# Patient Record
Sex: FEMALE | Race: WHITE | NOT HISPANIC OR LATINO | URBAN - METROPOLITAN AREA
[De-identification: names, ages, dates, MRNs, and addresses within clinical notes are randomized per-mention and may not be internally consistent; named-entity substitution may affect disease eponyms.]

---

## 2018-07-03 ENCOUNTER — OUTPATIENT (OUTPATIENT)
Dept: OUTPATIENT SERVICES | Facility: HOSPITAL | Age: 68
LOS: 1 days | End: 2018-07-03
Payer: COMMERCIAL

## 2018-07-03 ENCOUNTER — OUTPATIENT (OUTPATIENT)
Dept: OUTPATIENT SERVICES | Facility: HOSPITAL | Age: 68
LOS: 1 days | End: 2018-07-03
Payer: MEDICARE

## 2018-07-03 DIAGNOSIS — Z22.321 CARRIER OR SUSPECTED CARRIER OF METHICILLIN SUSCEPTIBLE STAPHYLOCOCCUS AUREUS: ICD-10-CM

## 2018-07-03 LAB
MRSA PCR RESULT.: NEGATIVE — SIGNIFICANT CHANGE UP
S AUREUS DNA NOSE QL NAA+PROBE: NEGATIVE — SIGNIFICANT CHANGE UP

## 2018-07-03 PROCEDURE — 73502 X-RAY EXAM HIP UNI 2-3 VIEWS: CPT

## 2018-07-03 PROCEDURE — 73502 X-RAY EXAM HIP UNI 2-3 VIEWS: CPT | Mod: 26,LT

## 2018-07-03 PROCEDURE — 87641 MR-STAPH DNA AMP PROBE: CPT

## 2018-07-24 VITALS
DIASTOLIC BLOOD PRESSURE: 71 MMHG | RESPIRATION RATE: 20 BRPM | TEMPERATURE: 98 F | HEART RATE: 62 BPM | HEIGHT: 62 IN | WEIGHT: 134.48 LBS | OXYGEN SATURATION: 98 % | SYSTOLIC BLOOD PRESSURE: 183 MMHG

## 2018-07-24 NOTE — H&P ADULT - NSHPPHYSICALEXAM_GEN_ALL_CORE
Left hip decreased rom 2/2 pain  Rest of PE per MD clearance Left hip decreased rom 2/2 pain  MSK: No deformity or open wounds. No rashes or lesions. EHL/TA/GS/FHL 5/5 BLE. Sensation intact and equal BLE. Skin warm and well perfused. DP palpable BLE. Cap refill brisk.   Rest of PE per MD clearance

## 2018-07-24 NOTE — H&P ADULT - HISTORY OF PRESENT ILLNESS
68F c/o left hip pain  Presents today for elective left THR. 68F c/o left hip pain x several months without improvement. Pt denies accident or injury to bring on pain. Denies numbness/tingling/paresthesias to LE. Ambulates with cane for assistance. Presents today for elective left THR.

## 2018-07-24 NOTE — PATIENT PROFILE ADULT. - PSH
Surgery, elective  varicose vein removal,  Right leg History of     Surgery, elective  varicose vein removal,  Right leg

## 2018-07-24 NOTE — H&P ADULT - NSHPLABSRESULTS_GEN_ALL_CORE
Preop cbc, bmp, pt/inr, ptt wnl; nasal swab neg  UA sm leuk repeat dos  preop cxr wnl per clearance  preop ekg wnl per clearance

## 2018-07-25 ENCOUNTER — INPATIENT (INPATIENT)
Facility: HOSPITAL | Age: 68
LOS: 1 days | Discharge: HOME HEALTH | DRG: 470 | End: 2018-07-27
Payer: MEDICARE

## 2018-07-25 DIAGNOSIS — Z41.9 ENCOUNTER FOR PROCEDURE FOR PURPOSES OTHER THAN REMEDYING HEALTH STATE, UNSPECIFIED: Chronic | ICD-10-CM

## 2018-07-25 DIAGNOSIS — Z98.891 HISTORY OF UTERINE SCAR FROM PREVIOUS SURGERY: Chronic | ICD-10-CM

## 2018-07-25 DIAGNOSIS — M16.12 UNILATERAL PRIMARY OSTEOARTHRITIS, LEFT HIP: ICD-10-CM

## 2018-07-25 LAB
HCT VFR BLD CALC: 35.2 % — SIGNIFICANT CHANGE UP (ref 34.5–45)
HGB BLD-MCNC: 11.1 G/DL — LOW (ref 11.5–15.5)
MCHC RBC-ENTMCNC: 31.4 PG — SIGNIFICANT CHANGE UP (ref 27–34)
MCHC RBC-ENTMCNC: 31.5 G/DL — LOW (ref 32–36)
MCV RBC AUTO: 99.7 FL — SIGNIFICANT CHANGE UP (ref 80–100)
PLATELET # BLD AUTO: 115 K/UL — LOW (ref 150–400)
RBC # BLD: 3.53 M/UL — LOW (ref 3.8–5.2)
RBC # FLD: 12.3 % — SIGNIFICANT CHANGE UP (ref 10.3–16.9)
WBC # BLD: 4.4 K/UL — SIGNIFICANT CHANGE UP (ref 3.8–10.5)
WBC # FLD AUTO: 4.4 K/UL — SIGNIFICANT CHANGE UP (ref 3.8–10.5)

## 2018-07-25 PROCEDURE — 73502 X-RAY EXAM HIP UNI 2-3 VIEWS: CPT | Mod: 26,LT

## 2018-07-25 RX ORDER — DOCUSATE SODIUM 100 MG
100 CAPSULE ORAL THREE TIMES A DAY
Qty: 0 | Refills: 0 | Status: DISCONTINUED | OUTPATIENT
Start: 2018-07-25 | End: 2018-07-27

## 2018-07-25 RX ORDER — SCOPALAMINE 1 MG/3D
1 PATCH, EXTENDED RELEASE TRANSDERMAL ONCE
Qty: 0 | Refills: 0 | Status: COMPLETED | OUTPATIENT
Start: 2018-07-25 | End: 2018-07-25

## 2018-07-25 RX ORDER — METOCLOPRAMIDE HCL 10 MG
10 TABLET ORAL EVERY 6 HOURS
Qty: 0 | Refills: 0 | Status: DISCONTINUED | OUTPATIENT
Start: 2018-07-25 | End: 2018-07-27

## 2018-07-25 RX ORDER — SENNA PLUS 8.6 MG/1
2 TABLET ORAL AT BEDTIME
Qty: 0 | Refills: 0 | Status: DISCONTINUED | OUTPATIENT
Start: 2018-07-25 | End: 2018-07-25

## 2018-07-25 RX ORDER — HYDROMORPHONE HYDROCHLORIDE 2 MG/ML
0.5 INJECTION INTRAMUSCULAR; INTRAVENOUS; SUBCUTANEOUS
Qty: 0 | Refills: 0 | Status: DISCONTINUED | OUTPATIENT
Start: 2018-07-25 | End: 2018-07-26

## 2018-07-25 RX ORDER — CEFAZOLIN SODIUM 1 G
2000 VIAL (EA) INJECTION EVERY 8 HOURS
Qty: 0 | Refills: 0 | Status: COMPLETED | OUTPATIENT
Start: 2018-07-25 | End: 2018-07-25

## 2018-07-25 RX ORDER — HYDRALAZINE HCL 50 MG
10 TABLET ORAL ONCE
Qty: 0 | Refills: 0 | Status: COMPLETED | OUTPATIENT
Start: 2018-07-25 | End: 2018-07-25

## 2018-07-25 RX ORDER — AMLODIPINE BESYLATE 2.5 MG/1
10 TABLET ORAL ONCE
Qty: 0 | Refills: 0 | Status: COMPLETED | OUTPATIENT
Start: 2018-07-25 | End: 2018-07-25

## 2018-07-25 RX ORDER — ONDANSETRON 8 MG/1
4 TABLET, FILM COATED ORAL EVERY 6 HOURS
Qty: 0 | Refills: 0 | Status: DISCONTINUED | OUTPATIENT
Start: 2018-07-25 | End: 2018-07-27

## 2018-07-25 RX ORDER — SENNA PLUS 8.6 MG/1
2 TABLET ORAL AT BEDTIME
Qty: 0 | Refills: 0 | Status: DISCONTINUED | OUTPATIENT
Start: 2018-07-25 | End: 2018-07-27

## 2018-07-25 RX ORDER — SODIUM CHLORIDE 9 MG/ML
1000 INJECTION, SOLUTION INTRAVENOUS
Qty: 0 | Refills: 0 | Status: DISCONTINUED | OUTPATIENT
Start: 2018-07-25 | End: 2018-07-27

## 2018-07-25 RX ORDER — ACETAMINOPHEN 500 MG
650 TABLET ORAL EVERY 6 HOURS
Qty: 0 | Refills: 0 | Status: DISCONTINUED | OUTPATIENT
Start: 2018-07-25 | End: 2018-07-26

## 2018-07-25 RX ORDER — POLYETHYLENE GLYCOL 3350 17 G/17G
17 POWDER, FOR SOLUTION ORAL DAILY
Qty: 0 | Refills: 0 | Status: DISCONTINUED | OUTPATIENT
Start: 2018-07-25 | End: 2018-07-27

## 2018-07-25 RX ORDER — ASPIRIN/CALCIUM CARB/MAGNESIUM 324 MG
325 TABLET ORAL
Qty: 0 | Refills: 0 | Status: DISCONTINUED | OUTPATIENT
Start: 2018-07-25 | End: 2018-07-27

## 2018-07-25 RX ORDER — BUPIVACAINE 13.3 MG/ML
20 INJECTION, SUSPENSION, LIPOSOMAL INFILTRATION ONCE
Qty: 0 | Refills: 0 | Status: DISCONTINUED | OUTPATIENT
Start: 2018-07-25 | End: 2018-07-27

## 2018-07-25 RX ADMIN — Medication 100 MILLIGRAM(S): at 15:05

## 2018-07-25 RX ADMIN — HYDROMORPHONE HYDROCHLORIDE 0.5 MILLIGRAM(S): 2 INJECTION INTRAMUSCULAR; INTRAVENOUS; SUBCUTANEOUS at 13:25

## 2018-07-25 RX ADMIN — ONDANSETRON 4 MILLIGRAM(S): 8 TABLET, FILM COATED ORAL at 14:03

## 2018-07-25 RX ADMIN — HYDROMORPHONE HYDROCHLORIDE 0.5 MILLIGRAM(S): 2 INJECTION INTRAMUSCULAR; INTRAVENOUS; SUBCUTANEOUS at 19:50

## 2018-07-25 RX ADMIN — HYDROMORPHONE HYDROCHLORIDE 0.5 MILLIGRAM(S): 2 INJECTION INTRAMUSCULAR; INTRAVENOUS; SUBCUTANEOUS at 19:35

## 2018-07-25 RX ADMIN — Medication 10 MILLIGRAM(S): at 15:05

## 2018-07-25 RX ADMIN — Medication 100 MILLIGRAM(S): at 21:49

## 2018-07-25 RX ADMIN — SCOPALAMINE 1 PATCH: 1 PATCH, EXTENDED RELEASE TRANSDERMAL at 20:24

## 2018-07-25 RX ADMIN — HYDROMORPHONE HYDROCHLORIDE 0.5 MILLIGRAM(S): 2 INJECTION INTRAMUSCULAR; INTRAVENOUS; SUBCUTANEOUS at 14:17

## 2018-07-25 RX ADMIN — SODIUM CHLORIDE 80 MILLILITER(S): 9 INJECTION, SOLUTION INTRAVENOUS at 14:09

## 2018-07-25 RX ADMIN — Medication 100 MILLIGRAM(S): at 23:53

## 2018-07-25 RX ADMIN — POLYETHYLENE GLYCOL 3350 17 GRAM(S): 17 POWDER, FOR SOLUTION ORAL at 15:04

## 2018-07-25 RX ADMIN — SENNA PLUS 2 TABLET(S): 8.6 TABLET ORAL at 21:49

## 2018-07-25 RX ADMIN — Medication 650 MILLIGRAM(S): at 21:49

## 2018-07-25 RX ADMIN — Medication 650 MILLIGRAM(S): at 15:05

## 2018-07-25 RX ADMIN — Medication 325 MILLIGRAM(S): at 17:48

## 2018-07-25 RX ADMIN — ONDANSETRON 4 MILLIGRAM(S): 8 TABLET, FILM COATED ORAL at 19:50

## 2018-07-25 RX ADMIN — AMLODIPINE BESYLATE 10 MILLIGRAM(S): 2.5 TABLET ORAL at 20:24

## 2018-07-25 NOTE — DISCHARGE NOTE ADULT - MEDICATION SUMMARY - MEDICATIONS TO TAKE
I will START or STAY ON the medications listed below when I get home from the hospital:    celecoxib 200 mg oral capsule  -- 1 cap(s) by mouth 2 times a day  -- Indication: For Antiinflammatory, pain control    acetaminophen 325 mg oral tablet  -- 2 tab(s) by mouth every 6 hours, as needed, pain (1-3) or fever T100.3 or higher  -- Indication: For Mild pain; fever     oxyCODONE 5 mg oral tablet  -- 1 tab(s) by mouth every 4 hours, As needed, Moderate Pain (4 - 6)  -- Indication: For Moderate pain    oxyCODONE 10 mg oral tablet  -- 1 tab(s) by mouth every 4 hours, As needed, Severe Pain (7 - 10)  -- Indication: For Severe pain    aspirin 325 mg oral delayed release tablet  -- 1 tab(s) by mouth 2 times a day  Take for 4 weeks after surgery to prevent blood clots  -- Indication: For dvt ppx    bisacodyl 10 mg rectal suppository  -- 1 suppository(ies) rectally once a day, As needed, If no bowel movement by POD#2  -- Indication: For constipation    docusate sodium 100 mg oral capsule  -- 1 cap(s) by mouth 3 times a day  -- Indication: For constipation    polyethylene glycol 3350 oral powder for reconstitution  -- 17 gram(s) by mouth once a day  -- Indication: For constipation    senna oral tablet  -- 2 tab(s) by mouth once a day (at bedtime)  -- Indication: For constipation

## 2018-07-25 NOTE — DISCHARGE NOTE ADULT - HOSPITAL COURSE
Admitted  Surgery - L PORTILLO 7/25/18  Perioperative abx  Pain control  DVT ppx  PT consult Admitted L PORTILLO  Surgery - L PORTILLO (posterior) 7/25/18  Perioperative abx  Pain control  DVT ppx  PT/OT consult Admitted L PORTILLO  Surgery - L PORTILLO (posterior) 7/25/18  Perioperative abx  Pain control  DVT ppx  PT/OT consult  med c/s

## 2018-07-25 NOTE — PROGRESS NOTE ADULT - SUBJECTIVE AND OBJECTIVE BOX
Ortho Post Op Check    Procedure: Right PORTILLO  Surgeon: Dr. Emmanuel    Pt pain controlled, complains of lower abdominal discomfort  Denies CP, SOB, N/V, numbness/tingling     Vital Signs Last 24 Hrs  T(C): 36 (07-25-18 @ 09:59), Max: 36 (07-25-18 @ 09:59)  T(F): 96.8 (07-25-18 @ 09:59), Max: 96.8 (07-25-18 @ 09:59)  HR: 58 (07-25-18 @ 11:41) (54 - 62)  BP: 160/72 (07-25-18 @ 10:56) (152/70 - 167/74)  BP(mean): 103 (07-25-18 @ 10:56) (100 - 106)  RR: 18 (07-25-18 @ 11:41) (17 - 26)  SpO2: 98% (07-25-18 @ 11:41) (94% - 98%)    General: Pt Alert and oriented, NAD, lying supine in bed  Abdominal: prior abdominal surgical scar noted. Lower abdomen is slightly distended, diffusely tender to palpation (suprapubic region). No masses palpated.   DSG C/D/I on right hip  Pulses: DP equal and intact B/L LE  Sensation: equal and intact B/L LE  Motor: EHL/FHL/TA/GS 5/5 in B/L LE                          11.1   4.4   )-----------( 115      ( 25 Jul 2018 10:32 )             35.2     Bladder scan showed 999mL      Post-op X-Ray: prosthesis in place    A/P: 68yFemale POD#0 s/p right PORTILLO  - Stable  - Pain Control  - Inserted arreola catheter  - IV Hydralazine for elevated BP   - Medicine consult Dr. Chinchilla  - DVT ppx: ASA and SCD  - Post op abx: Ancef  - PT, WBS: WBAT    Ortho Pager [phone]

## 2018-07-25 NOTE — DISCHARGE NOTE ADULT - ADDITIONAL INSTRUCTIONS
No strenuous activity, heavy lifting, driving, tub bathing, or returning to work until cleared by MD.  You may shower--dressing is waterproof.  Remove dressing after post op day 7, then leave incision open to air.  Follow up with Dr. Emmanuel in his office in 2 weeks.  Any staples/sutures will be removed in 2 weeks  If you don't have a bowel movement by post op day 3, then take Milk of Magnesia (over the counter).  If no bowel movement by at least post op day 5, then use a Dulcolax suppository (over the counter) and/or a Fleets enema--if still no bowel movement, call your MD.  Contact your doctor if you experience: fever greater than 101.5, chills, chest pain, difficulty breathing, bleeding, redness or heat around the incision.    Pleas follow up with your primary care provider. Weight bearing as tolerated on left leg with rolling walker  Posterior hip precautions-- keep abduction pillow between legs when laying down  No strenuous activity, heavy lifting, driving, tub bathing, or returning to work until cleared by MD.  You may shower--dressing is waterproof.  Remove dressing after post op day 7, then leave incision open to air.  Follow up with Dr. Emmanuel in his office in 2 weeks.  Any staples/sutures will be removed in 2 weeks  If you don't have a bowel movement by post op day 3, then take Milk of Magnesia (over the counter).  If no bowel movement by at least post op day 5, then use a Dulcolax suppository (over the counter) and/or a Fleets enema--if still no bowel movement, call your MD.  Contact your doctor if you experience: fever greater than 101.5, chills, chest pain, difficulty breathing, bleeding, redness or heat around the incision.    Please follow up with your primary care provider.

## 2018-07-25 NOTE — DISCHARGE NOTE ADULT - MEDICATION SUMMARY - MEDICATIONS TO CHANGE
Dr Vaughn I will SWITCH the dose or number of times a day I take the medications listed below when I get home from the hospital:  None

## 2018-07-25 NOTE — DISCHARGE NOTE ADULT - CARE PLAN
Principal Discharge DX:	Primary osteoarthritis of left hip  Goal:	improvement after surgery  Assessment and plan of treatment:	see below Principal Discharge DX:	Primary osteoarthritis of left hip  Goal:	improvement after surgery L PORTILLO posterior approach 7/25/18  Assessment and plan of treatment:	see below

## 2018-07-25 NOTE — DISCHARGE NOTE ADULT - PLAN OF CARE
improvement after surgery see below yes improvement after surgery L PORTILLO posterior approach 7/25/18

## 2018-07-25 NOTE — DISCHARGE NOTE ADULT - PATIENT PORTAL LINK FT
You can access the [facility] Patient Portal, offered by [facility], by registering with the following website: [email or web address]

## 2018-07-25 NOTE — PHYSICAL THERAPY INITIAL EVALUATION ADULT - GENERAL OBSERVATIONS, REHAB EVAL
Patient received semi-supine no acute distress +abduction pillow +IV +arreola +CDI dressing, cleared for PT by RN Rome, agreeable to PT Eval. Left as found +Call bell, RN aware. FIM 1

## 2018-07-25 NOTE — DISCHARGE NOTE ADULT - CARE PROVIDER_API CALL
Guzman Emmanuel), Orthopaedic Surgery  [address]  [address] [place] [address]  Phone: [phone]  Fax: [phone]

## 2018-07-26 DIAGNOSIS — Z98.890 OTHER SPECIFIED POSTPROCEDURAL STATES: ICD-10-CM

## 2018-07-26 DIAGNOSIS — I10 ESSENTIAL (PRIMARY) HYPERTENSION: ICD-10-CM

## 2018-07-26 DIAGNOSIS — D50.0 IRON DEFICIENCY ANEMIA SECONDARY TO BLOOD LOSS (CHRONIC): ICD-10-CM

## 2018-07-26 LAB
ANION GAP SERPL CALC-SCNC: 12 MMOL/L — SIGNIFICANT CHANGE UP (ref 5–17)
BUN SERPL-MCNC: 7 MG/DL — SIGNIFICANT CHANGE UP (ref 7–23)
CALCIUM SERPL-MCNC: 9.3 MG/DL — SIGNIFICANT CHANGE UP (ref 8.4–10.5)
CHLORIDE SERPL-SCNC: 99 MMOL/L — SIGNIFICANT CHANGE UP (ref 96–108)
CO2 SERPL-SCNC: 28 MMOL/L — SIGNIFICANT CHANGE UP (ref 22–31)
CREAT SERPL-MCNC: 0.68 MG/DL — SIGNIFICANT CHANGE UP (ref 0.5–1.3)
GLUCOSE SERPL-MCNC: 146 MG/DL — HIGH (ref 70–99)
HCT VFR BLD CALC: 32.6 % — LOW (ref 34.5–45)
HGB BLD-MCNC: 10.7 G/DL — LOW (ref 11.5–15.5)
MCHC RBC-ENTMCNC: 31.8 PG — SIGNIFICANT CHANGE UP (ref 27–34)
MCHC RBC-ENTMCNC: 32.8 G/DL — SIGNIFICANT CHANGE UP (ref 32–36)
MCV RBC AUTO: 97 FL — SIGNIFICANT CHANGE UP (ref 80–100)
PLATELET # BLD AUTO: 112 K/UL — LOW (ref 150–400)
POTASSIUM SERPL-MCNC: 4.5 MMOL/L — SIGNIFICANT CHANGE UP (ref 3.5–5.3)
POTASSIUM SERPL-SCNC: 4.5 MMOL/L — SIGNIFICANT CHANGE UP (ref 3.5–5.3)
RBC # BLD: 3.36 M/UL — LOW (ref 3.8–5.2)
RBC # FLD: 11.9 % — SIGNIFICANT CHANGE UP (ref 10.3–16.9)
SODIUM SERPL-SCNC: 139 MMOL/L — SIGNIFICANT CHANGE UP (ref 135–145)
WBC # BLD: 6.8 K/UL — SIGNIFICANT CHANGE UP (ref 3.8–10.5)
WBC # FLD AUTO: 6.8 K/UL — SIGNIFICANT CHANGE UP (ref 3.8–10.5)

## 2018-07-26 PROCEDURE — 99233 SBSQ HOSP IP/OBS HIGH 50: CPT | Mod: GC

## 2018-07-26 RX ORDER — POLYETHYLENE GLYCOL 3350 17 G/17G
17 POWDER, FOR SOLUTION ORAL
Qty: 0 | Refills: 0 | COMMUNITY
Start: 2018-07-26

## 2018-07-26 RX ORDER — CELECOXIB 200 MG/1
200 CAPSULE ORAL
Qty: 0 | Refills: 0 | Status: DISCONTINUED | OUTPATIENT
Start: 2018-07-26 | End: 2018-07-27

## 2018-07-26 RX ORDER — OXYCODONE HYDROCHLORIDE 5 MG/1
10 TABLET ORAL EVERY 4 HOURS
Qty: 0 | Refills: 0 | Status: DISCONTINUED | OUTPATIENT
Start: 2018-07-26 | End: 2018-07-27

## 2018-07-26 RX ORDER — SENNA PLUS 8.6 MG/1
2 TABLET ORAL
Qty: 0 | Refills: 0 | COMMUNITY
Start: 2018-07-26

## 2018-07-26 RX ORDER — OXYCODONE HYDROCHLORIDE 5 MG/1
5 TABLET ORAL EVERY 4 HOURS
Qty: 0 | Refills: 0 | Status: DISCONTINUED | OUTPATIENT
Start: 2018-07-26 | End: 2018-07-27

## 2018-07-26 RX ORDER — KETOROLAC TROMETHAMINE 30 MG/ML
15 SYRINGE (ML) INJECTION EVERY 6 HOURS
Qty: 0 | Refills: 0 | Status: DISCONTINUED | OUTPATIENT
Start: 2018-07-26 | End: 2018-07-27

## 2018-07-26 RX ORDER — DOCUSATE SODIUM 100 MG
1 CAPSULE ORAL
Qty: 0 | Refills: 0 | COMMUNITY
Start: 2018-07-26

## 2018-07-26 RX ORDER — HYDROMORPHONE HYDROCHLORIDE 2 MG/ML
0.5 INJECTION INTRAMUSCULAR; INTRAVENOUS; SUBCUTANEOUS
Qty: 0 | Refills: 0 | Status: DISCONTINUED | OUTPATIENT
Start: 2018-07-26 | End: 2018-07-27

## 2018-07-26 RX ORDER — ACETAMINOPHEN 500 MG
975 TABLET ORAL EVERY 8 HOURS
Qty: 0 | Refills: 0 | Status: DISCONTINUED | OUTPATIENT
Start: 2018-07-26 | End: 2018-07-27

## 2018-07-26 RX ADMIN — CELECOXIB 200 MILLIGRAM(S): 200 CAPSULE ORAL at 18:30

## 2018-07-26 RX ADMIN — Medication 975 MILLIGRAM(S): at 13:49

## 2018-07-26 RX ADMIN — Medication 15 MILLIGRAM(S): at 08:35

## 2018-07-26 RX ADMIN — OXYCODONE HYDROCHLORIDE 5 MILLIGRAM(S): 5 TABLET ORAL at 10:53

## 2018-07-26 RX ADMIN — Medication 100 MILLIGRAM(S): at 11:33

## 2018-07-26 RX ADMIN — SENNA PLUS 2 TABLET(S): 8.6 TABLET ORAL at 23:46

## 2018-07-26 RX ADMIN — OXYCODONE HYDROCHLORIDE 5 MILLIGRAM(S): 5 TABLET ORAL at 09:53

## 2018-07-26 RX ADMIN — POLYETHYLENE GLYCOL 3350 17 GRAM(S): 17 POWDER, FOR SOLUTION ORAL at 17:31

## 2018-07-26 RX ADMIN — OXYCODONE HYDROCHLORIDE 5 MILLIGRAM(S): 5 TABLET ORAL at 23:45

## 2018-07-26 RX ADMIN — Medication 975 MILLIGRAM(S): at 09:16

## 2018-07-26 RX ADMIN — Medication 975 MILLIGRAM(S): at 10:00

## 2018-07-26 RX ADMIN — Medication 15 MILLIGRAM(S): at 23:46

## 2018-07-26 RX ADMIN — Medication 325 MILLIGRAM(S): at 05:56

## 2018-07-26 RX ADMIN — Medication 100 MILLIGRAM(S): at 23:47

## 2018-07-26 RX ADMIN — Medication 15 MILLIGRAM(S): at 11:33

## 2018-07-26 RX ADMIN — Medication 975 MILLIGRAM(S): at 14:49

## 2018-07-26 RX ADMIN — Medication 15 MILLIGRAM(S): at 17:45

## 2018-07-26 RX ADMIN — Medication 15 MILLIGRAM(S): at 17:31

## 2018-07-26 RX ADMIN — CELECOXIB 200 MILLIGRAM(S): 200 CAPSULE ORAL at 17:31

## 2018-07-26 RX ADMIN — Medication 15 MILLIGRAM(S): at 09:00

## 2018-07-26 RX ADMIN — Medication 15 MILLIGRAM(S): at 12:00

## 2018-07-26 RX ADMIN — Medication 650 MILLIGRAM(S): at 05:56

## 2018-07-26 RX ADMIN — Medication 100 MILLIGRAM(S): at 05:56

## 2018-07-26 RX ADMIN — ONDANSETRON 4 MILLIGRAM(S): 8 TABLET, FILM COATED ORAL at 05:56

## 2018-07-26 RX ADMIN — Medication 325 MILLIGRAM(S): at 17:31

## 2018-07-26 RX ADMIN — ONDANSETRON 4 MILLIGRAM(S): 8 TABLET, FILM COATED ORAL at 18:47

## 2018-07-26 RX ADMIN — Medication 975 MILLIGRAM(S): at 23:46

## 2018-07-26 NOTE — PROGRESS NOTE ADULT - SUBJECTIVE AND OBJECTIVE BOX
S: No acute events overnight. No CP or SOB. Pain controlled. Complaining of nausea.     Vital Signs Last 24 Hrs  T(C): 36.1 (26 Jul 2018 05:00), Max: 36.9 (26 Jul 2018 01:00)  T(F): 97 (26 Jul 2018 05:00), Max: 98.4 (26 Jul 2018 01:00)  HR: 74 (26 Jul 2018 05:00) (54 - 74)  BP: 147/56 (26 Jul 2018 05:00) (142/43 - 186/80)  BP(mean): 102 (25 Jul 2018 14:22) (100 - 153)  RR: 20 (26 Jul 2018 05:00) (14 - 26)  SpO2: 100% (26 Jul 2018 05:00) (93% - 100%)    I&O's Summary    25 Jul 2018 07:01  -  26 Jul 2018 06:06  --------------------------------------------------------  IN: 810 mL / OUT: 1750 mL / NET: -940 mL        L Hip dressing CDI  Motor: 5/5 GS/TA/EHL/FHL    Sensation: SILT sural, saph, DP, SP and tibial n distribution  Pulses: WWP, DP/PT 2+                            11.1   4.4   )-----------( 115      ( 25 Jul 2018 10:32 )             35.2             MEDICATIONS  (STANDING):  acetaminophen   Tablet 650 milliGRAM(s) Oral every 6 hours  aspirin enteric coated 325 milliGRAM(s) Oral two times a day  BUpivacaine liposome 1.3% Injectable (no eMAR) 20 milliLiter(s) Local Injection once  docusate sodium 100 milliGRAM(s) Oral three times a day  lactated ringers. 1000 milliLiter(s) (80 mL/Hr) IV Continuous <Continuous>  polyethylene glycol 3350 17 Gram(s) Oral daily  senna 2 Tablet(s) Oral at bedtime    MEDICATIONS  (PRN):  aluminum hydroxide/magnesium hydroxide/simethicone Suspension 30 milliLiter(s) Oral four times a day PRN Indigestion  HYDROmorphone  Injectable 0.5 milliGRAM(s) IV Push every 3 hours PRN Severe Pain (7 - 10)  HYDROmorphone  Injectable 0.5 milliGRAM(s) IV Push every 10 minutes PRN Severe Pain  metoclopramide Injectable 10 milliGRAM(s) IV Push every 6 hours PRN nausea, vomiting  ondansetron Injectable 4 milliGRAM(s) IV Push every 6 hours PRN Nausea and/or Vomiting      A/P:  68y Female s/p L PORTILLO, POD 1  -Stable  -Pain Control  -PT/WBAT  -DVT ppx: ASA  -f/u labs  -Dispo: Pending PT eval

## 2018-07-26 NOTE — PROGRESS NOTE ADULT - PROBLEM SELECTOR PLAN 1
Was most likely related to pain. The blood pressure is controlled today as the pain is also control. She's not own and to hypertension medication

## 2018-07-26 NOTE — PROGRESS NOTE ADULT - SUBJECTIVE AND OBJECTIVE BOX
Interval Events: Reviewed  Patient seen and examined at bedside.    Patient is a 68y old  Female who presents with a chief complaint of Left hip pain/ left total hip replacement (posterior approach) 18 (2018 14:32)    She is doing okay. Yesterday she had lots of pain and that's why her blood pressure is high today the pain is controlled and did physical therapy without a problem  PAST MEDICAL & SURGICAL HISTORY:  OA (osteoarthritis)  History of   Surgery, elective: varicose vein removal,  Right leg      MEDICATIONS:  Pulmonary:    Antimicrobials:    Anticoagulants:  aspirin enteric coated 325 milliGRAM(s) Oral two times a day    Cardiac:      Allergies    No Known Allergies    Intolerances        Vital Signs Last 24 Hrs  T(C): 36.7 (2018 17:20), Max: 36.9 (2018 01:00)  T(F): 98 (2018 17:20), Max: 98.4 (2018 01:00)  HR: 59 (2018 17:20) (59 - 78)  BP: 136/48 (2018 17:20) (118/40 - 171/62)  BP(mean): --  RR: 17 (2018 17:20) (15 - 20)  SpO2: 100% (2018 17:20) (95% - 100%)     @ : @ 07:00  --------------------------------------------------------  IN: 810 mL / OUT: 2100 mL / NET: -1290 mL     @ 07: @ 19:43  --------------------------------------------------------  IN: 0 mL / OUT: 525 mL / NET: -525 mL          LABS:      CBC Full  -  ( 2018 08:16 )  WBC Count : 6.8 K/uL  Hemoglobin : 10.7 g/dL  Hematocrit : 32.6 %  Platelet Count - Automated : 112 K/uL  Mean Cell Volume : 97.0 fL  Mean Cell Hemoglobin : 31.8 pg  Mean Cell Hemoglobin Concentration : 32.8 g/dL  Auto Neutrophil # : x  Auto Lymphocyte # : x  Auto Monocyte # : x  Auto Eosinophil # : x  Auto Basophil # : x  Auto Neutrophil % : x  Auto Lymphocyte % : x  Auto Monocyte % : x  Auto Eosinophil % : x  Auto Basophil % : x        139  |  99  |  7   ----------------------------<  146<H>  4.5   |  28  |  0.68    Ca    9.3      2018 08:16                          RADIOLOGY & ADDITIONAL STUDIES (The following images were personally reviewed):  Bautista:                                     No  Urine output:                       adequate  DVT prophylaxis:                 Yes  Flattus:                                  Yes  Bowel movement:              No

## 2018-07-26 NOTE — PROGRESS NOTE ADULT - SUBJECTIVE AND OBJECTIVE BOX
ORTHO NOTE    [x ] Pt seen/examined.  [x ] Pt without any complaints/in NAD.  Denies nausea/vomiting this morning. Reports pain better controlled with changes in medication    [ ] Pt complains of:      ROS: [ ] Fever  [ ] Chills  [ ] CP [ ] SOB [ ] Dysnea  [ ] Palpitations [ ] Cough [ ] N/V/C/D [ ] Paresthia [ ] Other     [x ] ROS  otherwise negative    .    PHYSICAL EXAM:    Vital Signs Last 24 Hrs  T(C): 36.2 (26 Jul 2018 08:35), Max: 36.9 (26 Jul 2018 01:00)  T(F): 97.1 (26 Jul 2018 08:35), Max: 98.4 (26 Jul 2018 01:00)  HR: 67 (26 Jul 2018 08:35) (56 - 74)  BP: 150/49 (26 Jul 2018 08:35) (142/43 - 186/80)  BP(mean): 102 (25 Jul 2018 14:22) (102 - 153)  RR: 18 (26 Jul 2018 08:35) (14 - 22)  SpO2: 100% (26 Jul 2018 08:35) (93% - 100%)    I&O's Detail    25 Jul 2018 07:01  -  26 Jul 2018 07:00  --------------------------------------------------------  IN:    lactated ringers.: 760 mL    Solution: 50 mL  Total IN: 810 mL    OUT:    Indwelling Catheter - Urethral: 2100 mL  Total OUT: 2100 mL    Total NET: -1290 mL      26 Jul 2018 07:01  -  26 Jul 2018 11:08  --------------------------------------------------------  IN:  Total IN: 0 mL    OUT:    Indwelling Catheter - Urethral: 375 mL  Total OUT: 375 mL    Total NET: -375 mL           CAPILLARY BLOOD GLUCOSE                      Neuro: AAOX3    Lungs: nonlabored, Spo2 wnl    CV:    ABD: soft, nontender    Ext: left posterior hip aquacel dressing cdi, L LE nvid motor 5/5, hip abduction pillow implemented    LABS                        10.7   6.8   )-----------( 112      ( 26 Jul 2018 08:16 )             32.6                                07-26    139  |  99  |  7   ----------------------------<  146<H>  4.5   |  28  |  0.68    Ca    9.3      26 Jul 2018 08:16        [ ] Other Labs  [ ] None ordered            Please check or [place] when present:  •  Heart Failure:    [ ] Acute        [ ]  Acute on Chronic        [ ] Chronic         [ ] Diastolic     [ ]  Combined    •  CRISTINA:     [ ] ATN        [ ]  Renal medullary necrosis       [ ]  Renal cortical necrosis                  [ ] Other pathological Lesion:  •  CKD:  [ ] Stage I   [ ] Stage II  [ ] Stage III    [ ]Stage IV   [ ]  CKD V   [ ]  Other/Unspecified:    •  Abdominal Nutritional Status:   [ ] Malnutrition-See Nutrition note    [ ] Cachexia   [ ]  Other        [ ] Supplement ordered:            [ ] Morbid Obesity: BMI >=40         ASSESSMENT/PLAN:      STATUS POST: L PORTILLO (posterior approach) 7/25/18  pain control  bowel regimen  encouraged po fluid and mobilization    CONTINUE:          [ ] PT/OT- wbat, posterior hip precautions    [ ] DVT PPX- scd, ASA    [ ] Pain Mgt- acetaminophen 975mg q 8, toradol 15mg q 6 x6, oxycodone 5 to 10mg q 4 prn pain    [ ] Dispo plan- pending PT evaluation ORTHO NOTE    [x ] Pt seen/examined at 9am  [x ] Pt without any complaints/in NAD.  Denies nausea/vomiting this morning. Reports pain better controlled with changes in medication    [ ] Pt complains of:      ROS: [ ] Fever  [ ] Chills  [ ] CP [ ] SOB [ ] Dysnea  [ ] Palpitations [ ] Cough [ ] N/V/C/D [ ] Paresthia [ ] Other     [x ] ROS  otherwise negative    .    PHYSICAL EXAM:    Vital Signs Last 24 Hrs  T(C): 36.2 (26 Jul 2018 08:35), Max: 36.9 (26 Jul 2018 01:00)  T(F): 97.1 (26 Jul 2018 08:35), Max: 98.4 (26 Jul 2018 01:00)  HR: 67 (26 Jul 2018 08:35) (56 - 74)  BP: 150/49 (26 Jul 2018 08:35) (142/43 - 186/80)  BP(mean): 102 (25 Jul 2018 14:22) (102 - 153)  RR: 18 (26 Jul 2018 08:35) (14 - 22)  SpO2: 100% (26 Jul 2018 08:35) (93% - 100%)    I&O's Detail    25 Jul 2018 07:01  -  26 Jul 2018 07:00  --------------------------------------------------------  IN:    lactated ringers.: 760 mL    Solution: 50 mL  Total IN: 810 mL    OUT:    Indwelling Catheter - Urethral: 2100 mL  Total OUT: 2100 mL    Total NET: -1290 mL      26 Jul 2018 07:01  -  26 Jul 2018 11:08  --------------------------------------------------------  IN:  Total IN: 0 mL    OUT:    Indwelling Catheter - Urethral: 375 mL  Total OUT: 375 mL    Total NET: -375 mL           CAPILLARY BLOOD GLUCOSE                      Neuro: AAOX3    Lungs: nonlabored, Spo2 wnl    CV:    ABD: soft, nontender    Ext: left posterior hip aquacel dressing cdi, L LE nvid motor 5/5, hip abduction pillow implemented    LABS                        10.7   6.8   )-----------( 112      ( 26 Jul 2018 08:16 )             32.6                                07-26    139  |  99  |  7   ----------------------------<  146<H>  4.5   |  28  |  0.68    Ca    9.3      26 Jul 2018 08:16        [ ] Other Labs  [ ] None ordered            Please check or [place] when present:  •  Heart Failure:    [ ] Acute        [ ]  Acute on Chronic        [ ] Chronic         [ ] Diastolic     [ ]  Combined    •  CRISTINA:     [ ] ATN        [ ]  Renal medullary necrosis       [ ]  Renal cortical necrosis                  [ ] Other pathological Lesion:  •  CKD:  [ ] Stage I   [ ] Stage II  [ ] Stage III    [ ]Stage IV   [ ]  CKD V   [ ]  Other/Unspecified:    •  Abdominal Nutritional Status:   [ ] Malnutrition-See Nutrition note    [ ] Cachexia   [ ]  Other        [ ] Supplement ordered:            [ ] Morbid Obesity: BMI >=40         ASSESSMENT/PLAN:      STATUS POST: L PORTILLO (posterior approach) 7/25/18  pain control  bowel regimen  encouraged po fluid and mobilization    CONTINUE:          [ ] PT/OT- wbat, posterior hip precautions    [ ] DVT PPX- scd, ASA    [ ] Pain Mgt- acetaminophen 975mg q 8, toradol 15mg q 6 x6, oxycodone 5 to 10mg q 4 prn pain    [ ] Dispo plan- pending PT evaluation

## 2018-07-27 VITALS
TEMPERATURE: 97 F | OXYGEN SATURATION: 100 % | HEART RATE: 64 BPM | RESPIRATION RATE: 17 BRPM | SYSTOLIC BLOOD PRESSURE: 151 MMHG | DIASTOLIC BLOOD PRESSURE: 47 MMHG

## 2018-07-27 LAB
ANION GAP SERPL CALC-SCNC: 12 MMOL/L — SIGNIFICANT CHANGE UP (ref 5–17)
BUN SERPL-MCNC: 10 MG/DL — SIGNIFICANT CHANGE UP (ref 7–23)
CALCIUM SERPL-MCNC: 9.4 MG/DL — SIGNIFICANT CHANGE UP (ref 8.4–10.5)
CHLORIDE SERPL-SCNC: 99 MMOL/L — SIGNIFICANT CHANGE UP (ref 96–108)
CO2 SERPL-SCNC: 28 MMOL/L — SIGNIFICANT CHANGE UP (ref 22–31)
CREAT SERPL-MCNC: 0.78 MG/DL — SIGNIFICANT CHANGE UP (ref 0.5–1.3)
GLUCOSE SERPL-MCNC: 226 MG/DL — HIGH (ref 70–99)
HCT VFR BLD CALC: 33.3 % — LOW (ref 34.5–45)
HGB BLD-MCNC: 10.5 G/DL — LOW (ref 11.5–15.5)
MCHC RBC-ENTMCNC: 31.5 G/DL — LOW (ref 32–36)
MCHC RBC-ENTMCNC: 31.7 PG — SIGNIFICANT CHANGE UP (ref 27–34)
MCV RBC AUTO: 100.6 FL — HIGH (ref 80–100)
PLATELET # BLD AUTO: 102 K/UL — LOW (ref 150–400)
POTASSIUM SERPL-MCNC: 4.1 MMOL/L — SIGNIFICANT CHANGE UP (ref 3.5–5.3)
POTASSIUM SERPL-SCNC: 4.1 MMOL/L — SIGNIFICANT CHANGE UP (ref 3.5–5.3)
RBC # BLD: 3.31 M/UL — LOW (ref 3.8–5.2)
RBC # FLD: 12.6 % — SIGNIFICANT CHANGE UP (ref 10.3–16.9)
SODIUM SERPL-SCNC: 139 MMOL/L — SIGNIFICANT CHANGE UP (ref 135–145)
WBC # BLD: 6.2 K/UL — SIGNIFICANT CHANGE UP (ref 3.8–10.5)
WBC # FLD AUTO: 6.2 K/UL — SIGNIFICANT CHANGE UP (ref 3.8–10.5)

## 2018-07-27 PROCEDURE — 88311 DECALCIFY TISSUE: CPT

## 2018-07-27 PROCEDURE — 88305 TISSUE EXAM BY PATHOLOGIST: CPT

## 2018-07-27 PROCEDURE — 85027 COMPLETE CBC AUTOMATED: CPT

## 2018-07-27 PROCEDURE — 86850 RBC ANTIBODY SCREEN: CPT

## 2018-07-27 PROCEDURE — 86900 BLOOD TYPING SEROLOGIC ABO: CPT

## 2018-07-27 PROCEDURE — 80048 BASIC METABOLIC PNL TOTAL CA: CPT

## 2018-07-27 PROCEDURE — 72170 X-RAY EXAM OF PELVIS: CPT

## 2018-07-27 PROCEDURE — 97162 PT EVAL MOD COMPLEX 30 MIN: CPT

## 2018-07-27 PROCEDURE — 36415 COLL VENOUS BLD VENIPUNCTURE: CPT

## 2018-07-27 PROCEDURE — C1776: CPT

## 2018-07-27 PROCEDURE — 86901 BLOOD TYPING SEROLOGIC RH(D): CPT

## 2018-07-27 PROCEDURE — 73501 X-RAY EXAM HIP UNI 1 VIEW: CPT

## 2018-07-27 PROCEDURE — 97116 GAIT TRAINING THERAPY: CPT

## 2018-07-27 PROCEDURE — 97530 THERAPEUTIC ACTIVITIES: CPT

## 2018-07-27 RX ORDER — IBUPROFEN 200 MG
0 TABLET ORAL
Qty: 0 | Refills: 0 | COMMUNITY

## 2018-07-27 RX ORDER — ASPIRIN/CALCIUM CARB/MAGNESIUM 324 MG
1 TABLET ORAL
Qty: 0 | Refills: 0 | COMMUNITY
Start: 2018-07-27

## 2018-07-27 RX ORDER — METOCLOPRAMIDE HCL 10 MG
10 TABLET ORAL EVERY 8 HOURS
Qty: 0 | Refills: 0 | Status: DISCONTINUED | OUTPATIENT
Start: 2018-07-27 | End: 2018-07-27

## 2018-07-27 RX ORDER — ACETAMINOPHEN 500 MG
3 TABLET ORAL
Qty: 0 | Refills: 0 | COMMUNITY
Start: 2018-07-27

## 2018-07-27 RX ORDER — ACETAMINOPHEN 500 MG
0 TABLET ORAL
Qty: 0 | Refills: 0 | COMMUNITY

## 2018-07-27 RX ORDER — PREGABALIN 225 MG/1
0 CAPSULE ORAL
Qty: 0 | Refills: 0 | COMMUNITY

## 2018-07-27 RX ORDER — OXYCODONE HYDROCHLORIDE 5 MG/1
1 TABLET ORAL
Qty: 0 | Refills: 0 | COMMUNITY
Start: 2018-07-27

## 2018-07-27 RX ORDER — CELECOXIB 200 MG/1
1 CAPSULE ORAL
Qty: 0 | Refills: 0 | COMMUNITY
Start: 2018-07-27

## 2018-07-27 RX ADMIN — ONDANSETRON 4 MILLIGRAM(S): 8 TABLET, FILM COATED ORAL at 07:20

## 2018-07-27 RX ADMIN — Medication 975 MILLIGRAM(S): at 15:05

## 2018-07-27 RX ADMIN — Medication 15 MILLIGRAM(S): at 07:16

## 2018-07-27 RX ADMIN — CELECOXIB 200 MILLIGRAM(S): 200 CAPSULE ORAL at 07:42

## 2018-07-27 RX ADMIN — Medication 975 MILLIGRAM(S): at 00:15

## 2018-07-27 RX ADMIN — Medication 15 MILLIGRAM(S): at 12:23

## 2018-07-27 RX ADMIN — OXYCODONE HYDROCHLORIDE 5 MILLIGRAM(S): 5 TABLET ORAL at 07:17

## 2018-07-27 RX ADMIN — Medication 975 MILLIGRAM(S): at 07:42

## 2018-07-27 RX ADMIN — Medication 15 MILLIGRAM(S): at 07:42

## 2018-07-27 RX ADMIN — Medication 975 MILLIGRAM(S): at 07:16

## 2018-07-27 RX ADMIN — Medication 15 MILLIGRAM(S): at 00:15

## 2018-07-27 RX ADMIN — OXYCODONE HYDROCHLORIDE 5 MILLIGRAM(S): 5 TABLET ORAL at 07:42

## 2018-07-27 RX ADMIN — Medication 100 MILLIGRAM(S): at 07:17

## 2018-07-27 RX ADMIN — CELECOXIB 200 MILLIGRAM(S): 200 CAPSULE ORAL at 07:17

## 2018-07-27 RX ADMIN — OXYCODONE HYDROCHLORIDE 5 MILLIGRAM(S): 5 TABLET ORAL at 00:10

## 2018-07-27 RX ADMIN — POLYETHYLENE GLYCOL 3350 17 GRAM(S): 17 POWDER, FOR SOLUTION ORAL at 12:23

## 2018-07-27 RX ADMIN — Medication 325 MILLIGRAM(S): at 07:16

## 2018-07-27 RX ADMIN — Medication 100 MILLIGRAM(S): at 12:23

## 2018-07-27 RX ADMIN — Medication 15 MILLIGRAM(S): at 13:00

## 2018-07-27 RX ADMIN — Medication 10 MILLIGRAM(S): at 12:23

## 2018-07-27 NOTE — PROGRESS NOTE ADULT - SUBJECTIVE AND OBJECTIVE BOX
ORTHO NOTE    [x ] Pt seen/examined.  [ ] Pt without any complaints/in NAD.    [x ] Pt complains of: nausea after taking pain medicine with no food       ROS: [ ] Fever  [ ] Chills  [ ] CP [ ] SOB [ ] Dysnea  [ ] Palpitations [ ] Cough [ ] N/V/C/D [ ] Paresthia [ ] Other     [x ] ROS  otherwise negative    .    PHYSICAL EXAM:    Vital Signs Last 24 Hrs  T(C): 36.7 (27 Jul 2018 09:17), Max: 36.8 (26 Jul 2018 13:18)  T(F): 98 (27 Jul 2018 09:17), Max: 98.3 (26 Jul 2018 21:20)  HR: 93 (27 Jul 2018 09:17) (55 - 93)  BP: 116/85 (27 Jul 2018 09:17) (116/85 - 139/49)  BP(mean): --  RR: 17 (27 Jul 2018 09:17) (17 - 18)  SpO2: 100% (27 Jul 2018 09:17) (98% - 100%)    I&O's Detail    26 Jul 2018 07:01  -  27 Jul 2018 07:00  --------------------------------------------------------  IN:  Total IN: 0 mL    OUT:    Indwelling Catheter - Urethral: 375 mL    Voided: 350 mL  Total OUT: 725 mL    Total NET: -725 mL           CAPILLARY BLOOD GLUCOSE                      Neuro: AAOX3    Lungs: CTA, IS demonstrated    CV:    ABD: soft, nontender    Ext: right posterior hip dressing,     Motor: 5/5 GS/TA/EHL/FHL    Sensation: SILT sural, saph, DP, SP and tibial n distribution  Pulses: WWP, DP/PT 2+     LABS                        10.7   6.8   )-----------( 112      ( 26 Jul 2018 08:16 )             32.6                                07-26    139  |  99  |  7   ----------------------------<  146<H>  4.5   |  28  |  0.68    Ca    9.3      26 Jul 2018 08:16        [ ] Other Labs  [ ] None ordered            Please check or [place] when present:  •  Heart Failure:    [ ] Acute        [ ]  Acute on Chronic        [ ] Chronic         [ ] Diastolic     [ ]  Combined    •  CRISTINA:     [ ] ATN        [ ]  Renal medullary necrosis       [ ]  Renal cortical necrosis                  [ ] Other pathological Lesion:  •  CKD:  [ ] Stage I   [ ] Stage II  [ ] Stage III    [ ]Stage IV   [ ]  CKD V   [ ]  Other/Unspecified:    •  Abdominal Nutritional Status:   [ ] Malnutrition-See Nutrition note    [ ] Cachexia   [ ]  Other        [ ] Supplement ordered:            [ ] Morbid Obesity: BMI >=40         ASSESSMENT/PLAN:      STATUS POST: R PORTILLO posterior approach pod2  h/h stable  pain control  bowel regimen  medically cleared for [facility]  CONTINUE:          [ ] PT/OT- wbat, posterior hip precautions    [ ] DVT PPX- scd, ASA    [ ] Pain Mgt- po meds    [ ] Dispo plan- [facility] ORTHO NOTE    [x ] Pt seen/examined at 8:15am  [ ] Pt without any complaints/in NAD.    [x ] Pt complains of: nausea after taking pain medicine with no food       ROS: [ ] Fever  [ ] Chills  [ ] CP [ ] SOB [ ] Dysnea  [ ] Palpitations [ ] Cough [ ] N/V/C/D [ ] Paresthia [ ] Other     [x ] ROS  otherwise negative    .    PHYSICAL EXAM:    Vital Signs Last 24 Hrs  T(C): 36.7 (27 Jul 2018 09:17), Max: 36.8 (26 Jul 2018 13:18)  T(F): 98 (27 Jul 2018 09:17), Max: 98.3 (26 Jul 2018 21:20)  HR: 93 (27 Jul 2018 09:17) (55 - 93)  BP: 116/85 (27 Jul 2018 09:17) (116/85 - 139/49)  BP(mean): --  RR: 17 (27 Jul 2018 09:17) (17 - 18)  SpO2: 100% (27 Jul 2018 09:17) (98% - 100%)    I&O's Detail    26 Jul 2018 07:01  -  27 Jul 2018 07:00  --------------------------------------------------------  IN:  Total IN: 0 mL    OUT:    Indwelling Catheter - Urethral: 375 mL    Voided: 350 mL  Total OUT: 725 mL    Total NET: -725 mL           CAPILLARY BLOOD GLUCOSE                      Neuro: AAOX3    Lungs: CTA, IS demonstrated    CV:    ABD: soft, nontender    Ext: left posterior hip dressing,     Motor: 5/5 GS/TA/EHL/FHL    Sensation: SILT sural, saph, DP, SP and tibial n distribution  Pulses: WWP, DP/PT 2+     LABS                        10.7   6.8   )-----------( 112      ( 26 Jul 2018 08:16 )             32.6                                07-26    139  |  99  |  7   ----------------------------<  146<H>  4.5   |  28  |  0.68    Ca    9.3      26 Jul 2018 08:16        [ ] Other Labs  [ ] None ordered            Please check or [place] when present:  •  Heart Failure:    [ ] Acute        [ ]  Acute on Chronic        [ ] Chronic         [ ] Diastolic     [ ]  Combined    •  CRISTINA:     [ ] ATN        [ ]  Renal medullary necrosis       [ ]  Renal cortical necrosis                  [ ] Other pathological Lesion:  •  CKD:  [ ] Stage I   [ ] Stage II  [ ] Stage III    [ ]Stage IV   [ ]  CKD V   [ ]  Other/Unspecified:    •  Abdominal Nutritional Status:   [ ] Malnutrition-See Nutrition note    [ ] Cachexia   [ ]  Other        [ ] Supplement ordered:            [ ] Morbid Obesity: BMI >=40         ASSESSMENT/PLAN:      STATUS POST: L PORTILLO posterior approach pod2  h/h stable  pain control  bowel regimen  medically cleared for [facility]  CONTINUE:          [ ] PT/OT- wbat, posterior hip precautions    [ ] DVT PPX- scd, ASA    [ ] Pain Mgt- po meds    [ ] Dispo plan- [facility]

## 2018-07-31 LAB — SURGICAL PATHOLOGY STUDY: SIGNIFICANT CHANGE UP

## 2018-08-01 DIAGNOSIS — M79.606 PAIN IN LEG, UNSPECIFIED: ICD-10-CM

## 2018-08-01 DIAGNOSIS — M16.12 UNILATERAL PRIMARY OSTEOARTHRITIS, LEFT HIP: ICD-10-CM

## 2018-08-01 DIAGNOSIS — D50.0 IRON DEFICIENCY ANEMIA SECONDARY TO BLOOD LOSS (CHRONIC): ICD-10-CM

## 2018-08-01 DIAGNOSIS — I10 ESSENTIAL (PRIMARY) HYPERTENSION: ICD-10-CM

## 2021-01-27 NOTE — PHYSICAL THERAPY INITIAL EVALUATION ADULT - TRANSFER SKILLS, REHAB EVAL
Antiemetics to include palonosetron, ondansetron, lorazepam, olanzapine, hydroxyzine prn  IV hydration
independent

## 2023-02-09 NOTE — DISCHARGE NOTE ADULT - NSCORESITESY/N_GEN_A_CORE_RD
DATE: 02/09/2023    PREOPERATIVE DIAGNOSIS: metastatic adenocarcinoma    POSTOPERATIVE DIAGNOSIS: same    PROCEDURE PERFORMED: left subclavian MediPort placement.     ATTENDING SURGEON: Wojciech Martínez    ANESTHESIA: Monitored anesthesia care plus local.     ESTIMATED BLOOD LOSS: 10 mL     FINDINGS: A left subclavian  port placed with tip at junction of superior   vena cava and right atrium.     SPECIMEN: None.     DRAINS: None.     COMPLICATIONS: None.      OPERATIVE PROCEDURE: The patient was identified in Preoperative Holding,   brought back to the Operating Room, and placed supine on the operating table   and padded appropriately. Monitors were applied. Monitored anesthesia care   was initiated. The left chest was prepped and draped in the standard sterile   surgical fashion. A timeout was performed and all team members present agreed   this was the correct procedure on the correct patient. We also confirmed   administration of appropriate preoperative antibiotics.     After administration of appropriate local anesthesia, the left subclavian vein was cannulated with a hollow-bore needle. A guidewire was placed and confirmed to be in correct position by fluoroscopy. An appropriate area for the pocket was chosen, and the incision was anesthetized with lidocaine. A 4 cm transverse skin incision was made. Subcutaneous tissue was divided with Bovie electrocautery.  The   catheter was measured for length appropriately and cut. Additional local was   administered for the pocket for the port and then the port pocket was created   with a mixture of Bovie electrocautery and blunt dissection. The port was tunneled from the incision to the cannulation site with the tunneling device. The port was secured to the investing pectoral fascia with two 3-0 pds sutures. Under fluoroscopic guidance, the split sheath and dilator were placed over the guidewire, and the guidewire and dilator were then removed. The catheter was placed down  the split sheath and the sheath was split and peeled away. Fluoroscopy confirmed appropriate position of the catheter, which aspirated and flushed easily. Heparinized saline was instilled in the catheter. The skin incision was closed in layers with deep buried interrupted 3-0 pds and  running subcuticular 4-0 Monocryl. The cannulation incision was closed with a buried interrupted 4-0 Monocryl stitch. A sterile dressing was applied. The patient was transported to the Recovery Room in stable condition. All sponge, instrument and needle counts were correct at the end of the procedure. I was present and scrubbed for the entire case.    No